# Patient Record
Sex: MALE | Race: WHITE | URBAN - METROPOLITAN AREA
[De-identification: names, ages, dates, MRNs, and addresses within clinical notes are randomized per-mention and may not be internally consistent; named-entity substitution may affect disease eponyms.]

---

## 2022-03-19 PROBLEM — M79.602 PAIN OF LEFT UPPER EXTREMITY: Status: ACTIVE | Noted: 2017-07-07

## 2022-03-19 PROBLEM — M77.12 LEFT LATERAL EPICONDYLITIS: Status: ACTIVE | Noted: 2017-10-03

## 2024-05-10 ENCOUNTER — TELEPHONE (OUTPATIENT)
Dept: ORTHOPEDIC SURGERY | Age: 39
End: 2024-05-10

## 2024-05-10 NOTE — TELEPHONE ENCOUNTER
Pita Maria, APRN - CNP  Lisa Machuca  I can see Wednesday 5/15/24 @ 3:00 pm at [facility]    Referral message      I lvm to pt on 5/10  to call back  to  schedule

## 2024-05-15 ENCOUNTER — OFFICE VISIT (OUTPATIENT)
Age: 39
End: 2024-05-15
Payer: COMMERCIAL

## 2024-05-15 DIAGNOSIS — M79.641 RIGHT HAND PAIN: Primary | ICD-10-CM

## 2024-05-15 PROCEDURE — 99204 OFFICE O/P NEW MOD 45 MIN: CPT | Performed by: NURSE PRACTITIONER

## 2024-05-15 RX ORDER — METHYLPHENIDATE HYDROCHLORIDE 20 MG/1
TABLET ORAL
COMMUNITY

## 2024-05-15 RX ORDER — METHYLPREDNISOLONE 4 MG/1
TABLET ORAL
Qty: 1 KIT | Refills: 0 | Status: SHIPPED | OUTPATIENT
Start: 2024-05-15

## 2024-05-15 RX ORDER — MELOXICAM 15 MG/1
15 TABLET ORAL DAILY
Qty: 21 TABLET | Refills: 0 | Status: SHIPPED | OUTPATIENT
Start: 2024-05-15 | End: 2024-06-05

## 2024-05-15 RX ORDER — ALPRAZOLAM 0.5 MG/1
0.5 TABLET ORAL 3 TIMES DAILY PRN
COMMUNITY
Start: 2018-07-13

## 2024-05-15 NOTE — PROGRESS NOTES
[facility] Note    Name: Jassi Carias  YOB: 1985  Gender: male  MRN: [id number]      CC: Patient referred for evaluation of right hand pain    HPI: Jassi Carias is a 39 y.o. male right hand dominant with a chief complaint of right hand pain.  He reports 10 days ago, May 5, 2024, he was working on his truck.  He twisted something and felt a pop from his right middle finger to his wrist.  He had pain and swelling.  He was seen at urgent care.  He was x-rayed.  He was placed in a wrist lacer and referred to orthopedics for follow-up.  He reports continued discomfort and swelling.    ROS/Meds/PSH/PMH/FH/SH: I personally reviewed the patients standard intake form.  Pertinents are discussed in the HPI    Physical Examination:  General: Awake and alert.  HEENT: Normocephalic, atraumatic  CV/Pulm: Breathing even and unlabored  Skin: No obvious rashes noted.  Lymphatic: No obvious evidence of lymphedema or lymphadenopathy    Musculoskeletal Exam:  Examination on the right upper extremity demonstrates cap refill < 5 seconds in all fingers  Patient has swelling over the MCP joints.  He is able to make a full composite fist.  He does have trouble with full extension of his digits.  He is very weak in his resisted wrist extension.  He is unable to lift his fingers off a flat surface.  He denies paresthesia.  He is tender diffusely over the right middle finger MCP joint as well as the collateral ligaments.    Dr. Mason has examined the patient    Imaging / Electrodiagnostic Tests:     X-rays include a 3 view right hand are independently reviewed and interpreted.  No abnormality noted.    Dr. Mason has reviewed x-rays    Assessment:   1. Right hand pain        Plan:   We discussed the diagnosis and different treatment options. We discussed observation, therapy, antiinflammatory medications and other pertinent treatment modalities.    After discussing in detail the patient elects to proceed with Medrol